# Patient Record
Sex: MALE | Race: WHITE | NOT HISPANIC OR LATINO | Employment: FULL TIME | URBAN - METROPOLITAN AREA
[De-identification: names, ages, dates, MRNs, and addresses within clinical notes are randomized per-mention and may not be internally consistent; named-entity substitution may affect disease eponyms.]

---

## 2017-09-19 ENCOUNTER — COMMUNICATION - HEALTHEAST (OUTPATIENT)
Dept: CARDIOLOGY | Facility: CLINIC | Age: 32
End: 2017-09-19

## 2017-09-19 DIAGNOSIS — I25.10 CORONARY ARTERY DISEASE: ICD-10-CM

## 2017-11-21 ENCOUNTER — OFFICE VISIT - HEALTHEAST (OUTPATIENT)
Dept: CARDIOLOGY | Facility: CLINIC | Age: 32
End: 2017-11-21

## 2017-11-21 DIAGNOSIS — I25.5 ISCHEMIC CARDIOMYOPATHY: ICD-10-CM

## 2017-11-21 ASSESSMENT — MIFFLIN-ST. JEOR: SCORE: 1730.05

## 2017-12-01 ENCOUNTER — HOSPITAL ENCOUNTER (OUTPATIENT)
Dept: CARDIOLOGY | Facility: CLINIC | Age: 32
Discharge: HOME | End: 2017-12-01
Attending: INTERNAL MEDICINE

## 2017-12-01 LAB
AORTIC ROOT: 3.3 CM
AORTIC VALVE MEAN VELOCITY: 65.5 CM/S
AV DIMENSIONLESS INDEX VTI: 0.7
AV MEAN GRADIENT: 2 MMHG
AV PEAK GRADIENT: 4.2 MMHG
AV VALVE AREA: 2.6 CM2
AV VELOCITY RATIO: 0.8
BSA FOR ECHO PROCEDURE: 1.99 M2
CV BLOOD PRESSURE: NORMAL MMHG
CV ECHO HEIGHT: 68 IN
CV ECHO WEIGHT: 182 LBS
DOP CALC AO PEAK VEL: 102 CM/S
DOP CALC AO VTI: 24.5 CM
DOP CALC LVOT AREA: 3.46 CM2
DOP CALC LVOT DIAMETER: 2.1 CM
DOP CALC LVOT PEAK VEL: 82 CM/S
DOP CALC LVOT STROKE VOLUME: 63.4 CM3
DOP CALCLVOT PEAK VEL VTI: 18.3 CM
ECHO EJECTION FRACTION ESTIMATED: 55 %
FRACTIONAL SHORTENING: 24.2 % (ref 28–44)
INTERVENTRICULAR SEPTUM IN END DIASTOLE: 0.95 CM (ref 0.6–1)
IVS/PW RATIO: 1.1
LA AREA 1: 18.6 CM2
LA AREA 2: 17.7 CM2
LEFT ATRIUM LENGTH: 4.86 CM
LEFT ATRIUM SIZE: 3 CM
LEFT ATRIUM VOLUME INDEX: 28.9 ML/M2
LEFT ATRIUM VOLUME: 57.6 CM3
LEFT VENTRICLE CARDIAC INDEX: 2 L/MIN/M2
LEFT VENTRICLE CARDIAC OUTPUT: 4.1 L/MIN
LEFT VENTRICLE DIASTOLIC VOLUME INDEX: 41.7 CM3/M2 (ref 34–74)
LEFT VENTRICLE DIASTOLIC VOLUME: 83 CM3 (ref 62–150)
LEFT VENTRICLE HEART RATE: 64 BPM
LEFT VENTRICLE MASS INDEX: 93.8 G/M2
LEFT VENTRICULAR INTERNAL DIMENSION IN DIASTOLE: 5.46 CM (ref 4.2–5.8)
LEFT VENTRICULAR INTERNAL DIMENSION IN SYSTOLE: 4.14 CM (ref 2.5–4)
LEFT VENTRICULAR MASS: 186.6 G
LEFT VENTRICULAR OUTFLOW TRACT MEAN GRADIENT: 2 MMHG
LEFT VENTRICULAR OUTFLOW TRACT MEAN VELOCITY: 58.5 CM/S
LEFT VENTRICULAR OUTFLOW TRACT PEAK GRADIENT: 3 MMHG
LEFT VENTRICULAR POSTERIOR WALL IN END DIASTOLE: 0.87 CM (ref 0.6–1)
LV STROKE VOLUME INDEX: 31.8 ML/M2
MITRAL VALVE E/A RATIO: 1.1
MV AVERAGE E/E' RATIO: 6.6 CM/S
MV DECELERATION TIME: 176 MS
MV E'TISSUE VEL-LAT: 11 CM/S
MV E'TISSUE VEL-MED: 6.82 CM/S
MV LATERAL E/E' RATIO: 5.3
MV MEDIAL E/E' RATIO: 8.6
MV PEAK A VELOCITY: 51.3 CM/S
MV PEAK E VELOCITY: 58.7 CM/S
NUC REST DIASTOLIC VOLUME INDEX: 2912 LBS
NUC REST SYSTOLIC VOLUME INDEX: 68 IN
PR MAX PG: 3 MMHG
PR PEAK VELOCITY: 91.2 CM/S
TRICUSPID REGURGITATION PEAK PRESSURE GRADIENT: 13.8 MMHG
TRICUSPID VALVE ANULAR PLANE SYSTOLIC EXCURSION: 2.3 CM
TRICUSPID VALVE PEAK REGURGITANT VELOCITY: 186 CM/S

## 2017-12-01 ASSESSMENT — MIFFLIN-ST. JEOR: SCORE: 1730.05

## 2017-12-12 ENCOUNTER — COMMUNICATION - HEALTHEAST (OUTPATIENT)
Dept: CARDIOLOGY | Facility: CLINIC | Age: 32
End: 2017-12-12

## 2018-04-18 ENCOUNTER — COMMUNICATION - HEALTHEAST (OUTPATIENT)
Dept: CARDIOLOGY | Facility: CLINIC | Age: 33
End: 2018-04-18

## 2018-04-18 DIAGNOSIS — I25.10 CORONARY ARTERY DISEASE: ICD-10-CM

## 2018-11-13 ENCOUNTER — COMMUNICATION - HEALTHEAST (OUTPATIENT)
Dept: CARDIOLOGY | Facility: CLINIC | Age: 33
End: 2018-11-13

## 2018-11-13 DIAGNOSIS — I25.10 CORONARY ARTERY DISEASE: ICD-10-CM

## 2019-02-18 ENCOUNTER — COMMUNICATION - HEALTHEAST (OUTPATIENT)
Dept: CARDIOLOGY | Facility: CLINIC | Age: 34
End: 2019-02-18

## 2019-02-18 DIAGNOSIS — I25.10 CORONARY ARTERY DISEASE: ICD-10-CM

## 2019-03-20 ENCOUNTER — COMMUNICATION - HEALTHEAST (OUTPATIENT)
Dept: CARDIOLOGY | Facility: CLINIC | Age: 34
End: 2019-03-20

## 2019-03-20 DIAGNOSIS — I25.10 CORONARY ARTERY DISEASE: ICD-10-CM

## 2019-03-20 RX ORDER — METOPROLOL SUCCINATE 25 MG/1
TABLET, EXTENDED RELEASE ORAL
Qty: 90 TABLET | Refills: 2 | Status: SHIPPED | OUTPATIENT
Start: 2019-03-20

## 2019-07-16 ENCOUNTER — AMBULATORY - HEALTHEAST (OUTPATIENT)
Dept: CARDIAC REHAB | Facility: CLINIC | Age: 34
End: 2019-07-16

## 2019-07-16 DIAGNOSIS — Z95.5 STENTED CORONARY ARTERY: ICD-10-CM

## 2019-07-22 ENCOUNTER — AMBULATORY - HEALTHEAST (OUTPATIENT)
Dept: CARDIAC REHAB | Facility: CLINIC | Age: 34
End: 2019-07-22

## 2019-07-22 DIAGNOSIS — Z95.5 STENTED CORONARY ARTERY: ICD-10-CM

## 2019-07-22 DIAGNOSIS — I21.11 ST ELEVATION MYOCARDIAL INFARCTION INVOLVING RIGHT CORONARY ARTERY (H): ICD-10-CM

## 2019-07-22 RX ORDER — NITROGLYCERIN 0.4 MG/1
0.4 TABLET SUBLINGUAL EVERY 5 MIN PRN
Status: SHIPPED | COMMUNITY
Start: 2019-07-22

## 2019-07-22 RX ORDER — ATORVASTATIN CALCIUM 80 MG/1
80 TABLET, FILM COATED ORAL AT BEDTIME
Status: SHIPPED | COMMUNITY
Start: 2019-07-22

## 2019-07-24 ENCOUNTER — AMBULATORY - HEALTHEAST (OUTPATIENT)
Dept: CARDIAC REHAB | Facility: CLINIC | Age: 34
End: 2019-07-24

## 2019-07-24 DIAGNOSIS — I21.11 ST ELEVATION MYOCARDIAL INFARCTION INVOLVING RIGHT CORONARY ARTERY (H): ICD-10-CM

## 2019-07-24 DIAGNOSIS — Z95.5 STENTED CORONARY ARTERY: ICD-10-CM

## 2019-07-26 ENCOUNTER — AMBULATORY - HEALTHEAST (OUTPATIENT)
Dept: CARDIAC REHAB | Facility: CLINIC | Age: 34
End: 2019-07-26

## 2019-07-26 DIAGNOSIS — Z95.5 STENTED CORONARY ARTERY: ICD-10-CM

## 2019-07-26 DIAGNOSIS — I21.11 ST ELEVATION MYOCARDIAL INFARCTION INVOLVING RIGHT CORONARY ARTERY (H): ICD-10-CM

## 2019-07-29 ENCOUNTER — AMBULATORY - HEALTHEAST (OUTPATIENT)
Dept: CARDIAC REHAB | Facility: CLINIC | Age: 34
End: 2019-07-29

## 2019-07-29 DIAGNOSIS — I21.11 ST ELEVATION MYOCARDIAL INFARCTION INVOLVING RIGHT CORONARY ARTERY (H): ICD-10-CM

## 2019-07-29 DIAGNOSIS — Z95.5 STENTED CORONARY ARTERY: ICD-10-CM

## 2019-07-31 ENCOUNTER — AMBULATORY - HEALTHEAST (OUTPATIENT)
Dept: CARDIAC REHAB | Facility: CLINIC | Age: 34
End: 2019-07-31

## 2019-07-31 DIAGNOSIS — Z95.5 STENTED CORONARY ARTERY: ICD-10-CM

## 2019-08-02 ENCOUNTER — AMBULATORY - HEALTHEAST (OUTPATIENT)
Dept: CARDIAC REHAB | Facility: CLINIC | Age: 34
End: 2019-08-02

## 2019-08-02 DIAGNOSIS — I21.11 ST ELEVATION MYOCARDIAL INFARCTION INVOLVING RIGHT CORONARY ARTERY (H): ICD-10-CM

## 2019-08-02 DIAGNOSIS — Z95.5 STENTED CORONARY ARTERY: ICD-10-CM

## 2019-08-05 ENCOUNTER — AMBULATORY - HEALTHEAST (OUTPATIENT)
Dept: CARDIAC REHAB | Facility: CLINIC | Age: 34
End: 2019-08-05

## 2019-08-05 DIAGNOSIS — Z95.5 STENTED CORONARY ARTERY: ICD-10-CM

## 2019-08-05 DIAGNOSIS — I21.11 ST ELEVATION MYOCARDIAL INFARCTION INVOLVING RIGHT CORONARY ARTERY (H): ICD-10-CM

## 2019-08-07 ENCOUNTER — AMBULATORY - HEALTHEAST (OUTPATIENT)
Dept: CARDIAC REHAB | Facility: CLINIC | Age: 34
End: 2019-08-07

## 2019-08-07 DIAGNOSIS — Z95.5 STENTED CORONARY ARTERY: ICD-10-CM

## 2019-08-07 DIAGNOSIS — I21.11 ST ELEVATION MYOCARDIAL INFARCTION INVOLVING RIGHT CORONARY ARTERY (H): ICD-10-CM

## 2019-08-09 ENCOUNTER — AMBULATORY - HEALTHEAST (OUTPATIENT)
Dept: CARDIAC REHAB | Facility: CLINIC | Age: 34
End: 2019-08-09

## 2019-08-09 DIAGNOSIS — I21.11 ST ELEVATION MYOCARDIAL INFARCTION INVOLVING RIGHT CORONARY ARTERY (H): ICD-10-CM

## 2019-08-09 DIAGNOSIS — Z95.5 STENTED CORONARY ARTERY: ICD-10-CM

## 2019-08-12 ENCOUNTER — AMBULATORY - HEALTHEAST (OUTPATIENT)
Dept: CARDIAC REHAB | Facility: CLINIC | Age: 34
End: 2019-08-12

## 2019-08-12 DIAGNOSIS — Z95.5 STENTED CORONARY ARTERY: ICD-10-CM

## 2019-08-12 DIAGNOSIS — I21.11 ST ELEVATION MYOCARDIAL INFARCTION INVOLVING RIGHT CORONARY ARTERY (H): ICD-10-CM

## 2019-08-14 ENCOUNTER — AMBULATORY - HEALTHEAST (OUTPATIENT)
Dept: CARDIAC REHAB | Facility: CLINIC | Age: 34
End: 2019-08-14

## 2019-08-14 DIAGNOSIS — I21.11 ST ELEVATION MYOCARDIAL INFARCTION INVOLVING RIGHT CORONARY ARTERY (H): ICD-10-CM

## 2019-08-14 DIAGNOSIS — Z95.5 STENTED CORONARY ARTERY: ICD-10-CM

## 2019-08-16 ENCOUNTER — AMBULATORY - HEALTHEAST (OUTPATIENT)
Dept: CARDIAC REHAB | Facility: CLINIC | Age: 34
End: 2019-08-16

## 2019-08-16 DIAGNOSIS — Z95.5 STENTED CORONARY ARTERY: ICD-10-CM

## 2019-08-16 DIAGNOSIS — I21.11 ST ELEVATION MYOCARDIAL INFARCTION INVOLVING RIGHT CORONARY ARTERY (H): ICD-10-CM

## 2019-08-19 ENCOUNTER — AMBULATORY - HEALTHEAST (OUTPATIENT)
Dept: CARDIAC REHAB | Facility: CLINIC | Age: 34
End: 2019-08-19

## 2019-08-19 DIAGNOSIS — I21.11 ST ELEVATION MYOCARDIAL INFARCTION INVOLVING RIGHT CORONARY ARTERY (H): ICD-10-CM

## 2019-08-19 DIAGNOSIS — Z95.5 STENTED CORONARY ARTERY: ICD-10-CM

## 2019-08-21 ENCOUNTER — AMBULATORY - HEALTHEAST (OUTPATIENT)
Dept: CARDIAC REHAB | Facility: CLINIC | Age: 34
End: 2019-08-21

## 2019-08-21 DIAGNOSIS — Z95.5 STENTED CORONARY ARTERY: ICD-10-CM

## 2019-08-21 DIAGNOSIS — I21.11 ST ELEVATION MYOCARDIAL INFARCTION INVOLVING RIGHT CORONARY ARTERY (H): ICD-10-CM

## 2019-08-23 ENCOUNTER — AMBULATORY - HEALTHEAST (OUTPATIENT)
Dept: CARDIAC REHAB | Facility: CLINIC | Age: 34
End: 2019-08-23

## 2019-08-23 DIAGNOSIS — Z95.5 STENTED CORONARY ARTERY: ICD-10-CM

## 2019-08-23 DIAGNOSIS — I21.11 ST ELEVATION MYOCARDIAL INFARCTION INVOLVING RIGHT CORONARY ARTERY (H): ICD-10-CM

## 2020-01-24 ENCOUNTER — COMMUNICATION - HEALTHEAST (OUTPATIENT)
Dept: CARDIOLOGY | Facility: CLINIC | Age: 35
End: 2020-01-24

## 2020-01-24 DIAGNOSIS — I25.10 CORONARY ARTERY DISEASE: ICD-10-CM

## 2021-05-30 NOTE — PROGRESS NOTES
ITP ASSESSMENT   Assessment Day: Initial    Session Number: 1  Precautions: Standard Cardiac sx    Diagnosis: MI;Stent    Risk Stratification: Medium    Referring Provider: Maribel Daniels MD   ITP sent to Dr. Clark  EXERCISE  Exercise Assessment: Initial       6 Minute Walk Test   Pre   Pre Exercise HR: 58                    Pre Exercise BP: 88/60      Peak  Peak HR: 80                   Peak BP: 112/58    Peak feet: 1650    Peak O2 SAT: 100    Peak RPE: 7    Peak MPH: 3.12      Symptoms:  Peak Symptoms: denies sx      5 mins. Post  5 Min Post HR: 51    5 Min Post BP: 86/62                           Exercise Plan  Goals Next 30 days  ADL'S: Resume mowing lawn 2x/month or more by mocking 5-6 METs in rehab.    Leisure: Resume biking 1-3x/week for 20-30 mins.  LTG is to bike 3-4x/week for 1-2 hours.    Work: Pt has resumed regular work duties.      Education Goals: All goals in this section met    Education Goals Met: Patient can state cardiac s/s and appropriate emergency response.;Has system for taking medication.;Medication review.      Exercise Prescription  Exercise Mode: Treadmill;Bike;Nustep;Arm Erg.;Stairs    Frequency: 3x/week    Duration: 35-50 mins    Intensity / THR: 20-30 beats above resting heart rate    RPE 11-14  Progression / Met level: 4.5-6    Resistive Training?: Yes      Current Exercise (mins/week): 200 (Walking)      Interventions  Home Exercise:  Mode: Walking, biking    Frequency: 2-3x/week    Duration: 30-60 mins      Education Material : Educational videos;Provide written material;Individual education and counseling;Offer educational classes      Education Completed  Exercise Education Completed: Cardiac Anatomy;Signs and Symptoms;Medication review;RPE;Emergency Plan;Home Exercise;Warm up/cool down;FITT Principles;BP/HR Reponse to exercise;Benefits of Exercise;End point of exercise              Exercise Follow-up/Discharge  Follow up/Discharge: Pt is very active and wants to return to  "mountain biking several times per week.   NUTRITION  Nutrition Assessment: Initial      Nutrition Risk Factors:  Nutrition Risk Factors: NA      Nutrition Plan  Interventions  No data recorded  Other Nutrition Intervention: Therapist/Pt Discussion;Educational Videos;Provide with Written Material      Education Completed  Nutrition Education Completed: Risk factor overview      Goals  Nutrition Goals (Next 30 days): Patient will maintain current weight or gradual weight gain      Goals Met  Nutrition Goals Met: Completed Nutritional Risk Screen;Provided Rate your Plate Survey;Reviewed Dietitian schedule      Height, Weight, and  BMI  Weight: 153 lb 4.8 oz (69.5 kg)  Height: 5' 7\" (1.702 m)  BMI: 24      Nutrition Follow-up  Follow-up/Discharge: Pt has recently lost around 30 lbs.  He is not sure if he wants to meet with the dietician.  He may meet with dietician.         Other Risk Factors  Other Risk Factor Assessment: Initial      HTN Risk Factor: NA      Pre Exercise BP: (!) 88/60  Post Exercise BP: (!) 86/62        Tobacco Risk Factor: NA       PSYCHOSOCIAL  Psychosocial Assessment: Initial       [place] COOP Q of L Summary Score: 18      PHQ-9 Total Score: 3      Psychosocial Risk Factor: NA      Psychosocial Plan  Interventions  Interventions: Offer educational videos and classes;Provide written material;Individual education and counseling       Education Completed  Education Completed: Relaxation/Coping Techniques      Goals  Goals (Next 30 days): Identify stressors;Improvement in [place] COOP score;Practicing stress management skills      Goals Met  Goals Met: Identified Support system      Psychosocial Follow-up  Follow-up/Discharge: Pt denies regular stress and scored very low on PHQ-9.             Patient involved in Goal setting?: Yes      Signature: _____________________________________________________________    Date: __________________    Time: __________________  "

## 2021-05-30 NOTE — PROGRESS NOTES
Cardiac Rehab  Phase II Assessment    Assessment Date: 7/22/2019    Diagnosis: STEMI  Date of Onset: 7/7/19  Procedure: PCI with IWONA x 3  Date of Onset: 7/7/19  ICD/Pacemaker: No Parameters: NA  Post-op Complications: episode of V-tach, shock x 1  ECG History: SR/SA EF%:50-55, per echo 7/8/19  Past Medical History:  has a past medical history of Hypokalemia (11/13/2014), STEMI (ST elevation myocardial infarction) (H) (11/12/2014), and Ventricular fibrillation (H) (11/13/2014).   Hx of fracture of R scapula; lifetime non-smoker;     Physical Assessment  Precautions/ Physical Limitations: Standard cardiac sx  Oxygen: No  O2 Sats: 100 Lung Sounds: clear Edema: none  Incisions: clean/dry/intact  Sleeping Pattern: good   Appetite: good   Nutrition Risk Screen: Completed.    Pain  Location: denies  Characteristics:none  Intensity: (0-10 scale) 0  Current Pain Management: NA  Intervention: NA  Response: NA    Psychosocial/ Emotional Health  1. In the past 12 months, have you been in a relationship where you have been abused physically, emotionally, sexually or financially? No  notified: NA  2. Who do you turn to for emotional support?: wife  3. Do you have cultural or spiritual needs? No  4. Have there been any major life changes in the past 12 months? No    Referral Information  Primary Physician: Marco Alva MD- retired; [facility]  Cardiologist: Dr. Maribel Daniels  Surgeon: Dr. Maribel Daniels    Home exercise/Equipment: TM, road bike    Patient's long-term goal(s): Resume yardwork, riding bikes    1. Living Accommodations: Home Steps: Yes      Support people at home: wife   2. Marital Status:   3. Family is able to assist with cares      Lutheran/Community involvement: none  4. Recreation/Hobbies: Biking, skiing

## 2021-05-31 VITALS — WEIGHT: 182 LBS | BODY MASS INDEX: 27.58 KG/M2 | HEIGHT: 68 IN

## 2021-05-31 VITALS — HEIGHT: 68 IN | WEIGHT: 182 LBS | BODY MASS INDEX: 27.58 KG/M2

## 2021-05-31 NOTE — PROGRESS NOTES
ITP ASSESSMENT   Assessment Day: Last Day    Session Number: 16  Precautions: Standard Cardiac sx    Diagnosis: MI;Stent    Risk Stratification: Medium    Referring Provider: Marco Alva MD   ITP:Dr. Clark  EXERCISE  Exercise Assessment: Discharge       6 Minute Walk Test   Pre   Pre Exercise HR: 60                    Pre Exercise BP: 100/62      Peak  Peak HR: 87                   Peak BP: 114/70    Peak feet: 1760    Peak O2 SAT: 100    Peak RPE: 11    Peak MPH: 3.33      Symptoms:  Peak Symptoms: none      5 mins. Post  5 Min Post HR: 52    5 Min Post BP: 92/56                           Exercise Plan  Goals Next 30 days  ADL'S: Independent in all ADL's    Leisure: Independent in all Leisures    Work: Indepedent in all work duties       Education Goals: All goals in this section met    Education Goals Met: Patient can state cardiac s/s and appropriate emergency response.;Has system for taking medication.;Medication review.                          Goals Met  Initial ADL's goals met: Goal Met: Pt has resumed mowing lawn 2x/month.     Initial Leisure goals met: Goal Met: Pt has resumed biking 1-3x/week for 20-30 mins.  LTG is to bike 3-4x/week for 1-2 hours.    Intial Work goals met: Pt has resumed regular work duties.    Initial Progression: Pt has reached 7.8 METs on Vision, 7.6 METs on AirDyne, 6.1 METs on TM. Continue to encouage and progress pt to reach Long term goal of 8 METs.       Exercise Prescription  Exercise Mode: Treadmill;Bike;Nustep;Arm Erg.;Stairs    Frequency: 3x/week    Duration: 35-50 min    Intensity / THR: 20-30 beats above resting heart rate    RPE 11-14  Progression / Met level: 7-8+    Resistive Training?: Yes      Current Exercise (mins/week): 250      Interventions  Home Exercise:  Mode: Walking, Biking      Frequency: 3-4x/week    Duration:  min      Education Material : Educational videos;Provide written material;Individual education and counseling;Offer educational  "classes      Education Completed  Exercise Education Completed: Cardiac Anatomy;Signs and Symptoms;Medication review;RPE;Emergency Plan;Home Exercise;Warm up/cool down;FITT Principles;BP/HR Reponse to exercise;Benefits of Exercise;End point of exercise              Exercise Follow-up/Discharge  Follow up/Discharge: Discharge           NUTRITION  Nutrition Assessment: Discharge      Nutrition Risk Factors:  Nutrition Risk Factors: NA      Nutrition Plan  Interventions  Diet Consult: Completed    Other Nutrition Intervention: Therapist/Pt Discussion;Provide with Written Material;Educational Videos    Initial Rate Your Plate Score: 56        Education Completed  Nutrition Education Completed: Low Saturated fat diet;Low sodium diet          Goals Met  Nutrition Goals Met: Patient follows a low sodium diet;Patient states following a low saturated fat diet;Patient knows appropriate portion size      Height, Weight, and  BMI  Weight: 152 lb 1.6 oz (69 kg)  Height: 5' 7\" (1.702 m)  BMI: 23.82      Nutrition Follow-up  Follow-up/Discharge: RD diet educ completed 8/12       Other Risk Factors  Other Risk Factor Assessment: Discharge      HTN Risk Factor: NA      Pre Exercise BP: 100/62  Post Exercise BP: (!) 86/62      Tobacco Risk Factor: NA           PSYCHOSOCIAL  Psychosocial Assessment: Discharge       [place] COOP Q of L Summary Score: 13      PHQ-9 Total Score: 1      Psychosocial Risk Factor: NA      Psychosocial Plan  Interventions  Interventions: Offer educational videos and classes;Provide written material;Individual education and counseling      Education Completed  Education Completed: Relaxation/Coping Techniques          Goals Met  Goals Met: Practicing stress management skills;Improvement in [place] COOP score      Psychosocial Follow-up  Follow-up/Discharge: Discharge           Patient involved in Goal setting?: Yes  Pt pleased with progress, has reached 7.8 MET level.  Pt verbalized good understanding of " home program instructions and is motivated to continue exercising regularly.      Signature: _____________________________________________________________    Date: __________________    Time: __________________

## 2021-05-31 NOTE — PROGRESS NOTES
ITP ASSESSMENT   Assessment Day: 30 Day    Session Number: 15  Precautions: Standard Cardiac sx    Diagnosis: MI;Stent    Risk Stratification: Medium    Referring Provider: Marco Alva MD   ITP sent to Dr. Clark   EXERCISE  Exercise Assessment: Reassessment                         Exercise Plan  Goals Next 30 days  ADL'S: Independent in all ADL's    Leisure: Independent in all Leisures    Work: Indepedent in all work duties       Education Goals: All goals in this section met    Education Goals Met: Patient can state cardiac s/s and appropriate emergency response.;Has system for taking medication.;Medication review.                          Goals Met  Initial ADL's goals met: Goal Met: Pt has resumed mowing lawn 2x/month.     Initial Leisure goals met: Goal Met: Pt has resumed biking 1-3x/week for 20-30 mins.  LTG is to bike 3-4x/week for 1-2 hours.    Intial Work goals met: Pt has resumed regular work duties.    Initial Progression: Pt has reached 7.8 METs on Vision, 7.6 METs on AirDyne, 6.1 METs on TM. Continue to encouage and progress pt to reach Long term goal of 8 METs.       Exercise Prescription  Exercise Mode: Treadmill;Bike;Nustep;Arm Erg.;Stairs    Frequency: 3x/week    Duration: 35-50 min    Intensity / THR: 20-30 beats above resting heart rate    RPE 11-14  Progression / Met level: 7-8+    Resistive Training?: Yes      Current Exercise (mins/week): 200      Interventions  Home Exercise:  Mode: Walking, Biking      Frequency: 3-4x/week    Duration:  min      Education Material : Educational videos;Provide written material;Individual education and counseling;Offer educational classes      Education Completed  Exercise Education Completed: Cardiac Anatomy;Signs and Symptoms;Medication review;RPE;Emergency Plan;Home Exercise;Warm up/cool down;FITT Principles;BP/HR Reponse to exercise;Benefits of Exercise;End point of exercise              Exercise Follow-up/Discharge  Follow up/Discharge: Pt  "has returned to mountain biking several times per week. Continue to remind pt of Emergency plan with [employer].            NUTRITION  Nutrition Assessment: Reassessment      Nutrition Risk Factors:  Nutrition Risk Factors: NA      Nutrition Plan  Interventions  Diet Consult: Completed    Other Nutrition Intervention: Therapist/Pt Discussion;Provide with Written Material;Educational Videos    Initial Rate Your Plate Score: 56    Education Completed  Nutrition Education Completed: Low Saturated fat diet;Low sodium diet      Goals  Nutrition Goals (Next 30 days): Patient will follow a low sodium diet;Patient will follow a low saturated fat diet;Patient knows appropriate portion size      Goals Met  Nutrition Goals Met: Patient follows a low sodium diet;Patient states following a low saturated fat diet;Patient knows appropriate portion size      Height, Weight, and  BMI  Weight: 152 lb 1.6 oz (69 kg)  Height: 5' 7\" (1.702 m)  BMI: 23.82      Nutrition Follow-up  Follow-up/Discharge: RD diet educ completed 8/12       Other Risk Factors  Other Risk Factor Assessment: Reassessment      HTN Risk Factor: NA      Pre Exercise BP: 96/60  Post Exercise BP: 94/62        Tobacco Risk Factor: NA     PSYCHOSOCIAL  Psychosocial Assessment: Reassessment       [place] COOP Q of L Summary Score: 18      PHQ-9 Total Score: 3      Psychosocial Risk Factor: NA      Psychosocial Plan  Interventions  Interventions: Offer educational videos and classes;Provide written material;Individual education and counseling      Education Completed  Education Completed: Relaxation/Coping Techniques      Goals  Goals (Next 30 days): Improvement in [place] COOP score;Practicing stress management skills      Goals Met  Goals Met: Identified Support system      Psychosocial Follow-up  Follow-up/Discharge: Pt denies regular stress, Pt enjoys biking and playing Repeatit games            Patient involved in Goal setting?: Yes      Signature: " _____________________________________________________________    Date: __________________    Time: __________________

## 2021-06-03 VITALS — BODY MASS INDEX: 23.14 KG/M2 | WEIGHT: 152.2 LBS

## 2021-06-03 VITALS — WEIGHT: 152.1 LBS | BODY MASS INDEX: 23.13 KG/M2

## 2021-06-03 VITALS — WEIGHT: 152.9 LBS | BODY MASS INDEX: 23.25 KG/M2

## 2021-06-03 VITALS — WEIGHT: 151.5 LBS | BODY MASS INDEX: 23.04 KG/M2

## 2021-06-03 VITALS — BODY MASS INDEX: 23.25 KG/M2 | WEIGHT: 152.9 LBS

## 2021-06-03 VITALS — BODY MASS INDEX: 22.73 KG/M2 | WEIGHT: 149.5 LBS

## 2021-06-03 VITALS — WEIGHT: 150.5 LBS | BODY MASS INDEX: 22.88 KG/M2

## 2021-06-03 VITALS — WEIGHT: 150.7 LBS | BODY MASS INDEX: 22.91 KG/M2

## 2021-06-03 VITALS — BODY MASS INDEX: 23.19 KG/M2 | WEIGHT: 152.5 LBS

## 2021-06-03 VITALS — BODY MASS INDEX: 22.96 KG/M2 | WEIGHT: 151 LBS

## 2021-06-03 VITALS — WEIGHT: 148.5 LBS | BODY MASS INDEX: 22.58 KG/M2

## 2021-06-03 VITALS — WEIGHT: 149.4 LBS | BODY MASS INDEX: 22.72 KG/M2

## 2021-06-03 VITALS — BODY MASS INDEX: 23.31 KG/M2 | WEIGHT: 153.3 LBS

## 2021-06-03 VITALS — BODY MASS INDEX: 22.85 KG/M2 | WEIGHT: 150.3 LBS

## 2021-06-25 NOTE — PROGRESS NOTES
Progress Notes by America Dorado MD at 11/21/2017  8:10 AM     Author: America Dorado MD Service: -- Author Type: Physician    Filed: 11/21/2017  8:35 AM Encounter Date: 11/21/2017 Status: Signed    : America Dorado MD (Physician)           Click to link to [facility] HEART [facility] NOTE    Thank you, Dr. Alva, for asking us to see Anson Stone at the [facility].      Assessment/Recommendations   Assessment:    1.  Status post anterior myocardial infarction: Status post IWONA to LAD in 2014.  Doing well without any anginal complaints.  Continue on daily aspirin.  2.  History of ischemic cardiomyopathy: Follow-up MOLLY showed preserved left ventricular systolic function with wall motion abnormality.  Recommend repeat echocardiogram this year.  3.  Lipids are well-controlled.  Follow-up in 1 year.     History of Present Illness    Mr. Anson Stone is a 32 y.o. male with a known history of anterior myocardial infarction thought to be secondary to an embolus who is status post drug-eluting stent to the mid LAD on 11/12/14.  He has been doing well.  He exercises fairly regularly.  He has a dog and has been walking the dog frequently.  He has not noted any problems with chest discomfort or breathing difficulty.       Physical Examination Review of Systems   Vitals:    11/21/17 0813   BP: 128/82   Pulse: 72   Resp: 18     Body mass index is 27.67 kg/(m^2).  Wt Readings from Last 3 Encounters:   11/21/17 182 lb (82.6 kg)   11/15/16 179 lb (81.2 kg)   09/29/15 175 lb (79.4 kg)       General Appearance:   alert, no apparent distress   HEENT:  no scleral icterus; the mucous membranes are pink and moist                                  Neck: jugular venous pressure normal   Chest: the spine is straight and the chest is symmetric   Lungs:   respirations unlabored; the lungs are clear to auscultation   Cardiovascular:   regular rhythm with normal first  and second heart sounds and no murmurs or gallops   Abdomen:  no organomegaly, masses, bruits, or tenderness; bowel sounds are present   Extremities: no edema   Skin: no xanthelasma    General: WNL  Eyes: WNL  Ears/Nose/Throat: WNL  Lungs: WNL  Heart: WNL  Stomach: WNL  Bladder: WNL  Muscle/Joints: WNL  Skin: WNL  Nervous System: WNL  Mental Health: WNL     Blood: WNL     Medical History  Surgical History Family History Social History   Anterior STEMI Status post IWONA to LAD Family History   Problem Relation Age of Onset   ? Heart attack Paternal Uncle    ? Heart attack Paternal Grandmother     Social History     Social History   ? Marital status: Single     Spouse name: N/A   ? Number of children: N/A   ? Years of education: N/A     Occupational History   ? Not on file.     Social History Main Topics   ? Smoking status: Never Smoker   ? Smokeless tobacco: Never Used   ? Alcohol use Not on file   ? Drug use: No   ? Sexual activity: Not on file     Other Topics Concern   ? Not on file     Social History Narrative          Medications  Allergies   Current Outpatient Prescriptions   Medication Sig Dispense Refill   ? aspirin 81 MG EC tablet Take 81 mg by mouth daily.     ? metoprolol succinate (TOPROL-XL) 25 MG TAKE 1 TABLET BY MOUTH EVERY DAY 90 tablet 1     No current facility-administered medications for this visit.       No Known Allergies      Lab Results    Chemistry/lipid CBC Cardiac Enzymes/BNP/TSH/INR   Lab Results   Component Value Date    CHOL 133 11/15/2016    HDL 47 11/15/2016    LDLCALC 70 11/15/2016    TRIG 79 11/15/2016    CREATININE 1.07 03/11/2015    BUN 10 03/11/2015    K 3.8 03/11/2015     03/11/2015     03/11/2015    CO2 27 03/11/2015    Lab Results   Component Value Date    WBC 6.9 03/11/2015    HGB 16.0 03/11/2015    HCT 47.1 03/11/2015    MCV 90 03/11/2015     (L) 03/11/2015    Lab Results   Component Value Date    CKMB 92 (HH) 11/13/2014    TROPONINI <0.01 03/11/2015    INR  1.01 11/12/2014

## 2021-08-21 ENCOUNTER — HEALTH MAINTENANCE LETTER (OUTPATIENT)
Age: 36
End: 2021-08-21

## 2021-10-16 ENCOUNTER — HEALTH MAINTENANCE LETTER (OUTPATIENT)
Age: 36
End: 2021-10-16

## 2022-10-01 ENCOUNTER — HEALTH MAINTENANCE LETTER (OUTPATIENT)
Age: 37
End: 2022-10-01

## 2023-10-15 ENCOUNTER — HEALTH MAINTENANCE LETTER (OUTPATIENT)
Age: 38
End: 2023-10-15

## 2024-01-06 ENCOUNTER — ANCILLARY PROCEDURE (OUTPATIENT)
Dept: GENERAL RADIOLOGY | Facility: CLINIC | Age: 39
End: 2024-01-06
Attending: NURSE PRACTITIONER

## 2024-01-06 ENCOUNTER — OFFICE VISIT (OUTPATIENT)
Dept: URGENT CARE | Facility: URGENT CARE | Age: 39
End: 2024-01-06
Payer: OTHER MISCELLANEOUS

## 2024-01-06 VITALS
DIASTOLIC BLOOD PRESSURE: 84 MMHG | SYSTOLIC BLOOD PRESSURE: 124 MMHG | RESPIRATION RATE: 16 BRPM | OXYGEN SATURATION: 100 % | HEART RATE: 89 BPM

## 2024-01-06 DIAGNOSIS — S67.02XA CRUSHING INJURY OF LEFT THUMB, INITIAL ENCOUNTER: ICD-10-CM

## 2024-01-06 DIAGNOSIS — S67.02XA CRUSHING INJURY OF LEFT THUMB, INITIAL ENCOUNTER: Primary | ICD-10-CM

## 2024-01-06 PROCEDURE — 73140 X-RAY EXAM OF FINGER(S): CPT | Mod: TC | Performed by: RADIOLOGY

## 2024-01-06 PROCEDURE — 99204 OFFICE O/P NEW MOD 45 MIN: CPT | Performed by: NURSE PRACTITIONER

## 2024-01-06 NOTE — PROGRESS NOTES
Assessment & Plan     Crushing injury of left thumb, initial encounter  - XR Finger Left G/E 2 Views       Patient Instructions     Results for orders placed or performed in visit on 01/06/24   XR Finger Left G/E 2 Views     Status: None    Narrative    EXAM: XR FINGER LEFT G/E 2 VIEWS  LOCATION: [facility]  DATE: 1/6/2024    INDICATION: crush injury to distal thumb   in Ski boot binding, pain  COMPARISON: None.      Impression    IMPRESSION: No acute fracture or malalignment. Mild first MCP joint degenerative changes.       Rest the affected painful area as much as possible.    Apply ice for 15-20 minutes intermittently as needed and especially after any offending activity.   As pain recedes, begin normal activities slowly as tolerated.    Aleve twice a day for 3-4 days        Return in about 1 week (around 1/13/2024) for with regular provider if symptoms persist.    SAROJ Cueto CNP  [facility]    Yaima Griggs is a 38 year old male who presents to clinic today for the following health issues:  Chief Complaint   Patient presents with    Thumb Discomfort     L thumb was squished in between a ski binding      HPI    MS Injury/Pain    Onset of symptoms was 1 hour(s) ago.  Location: left finger  first  Context:       The injury happened while at work      Mechanism: crushed in a ski boot binding      Patient experienced immediate pain, immediate swelling, was able to bear weight directly after injury, no deformity was noted by the patient  Course of symptoms is same.    Severity moderate  Current and Associated symptoms: Pain, Swelling, and Stiffness  Denies  Warmth and Redness  Aggravating Factors: repetitive motion  Therapies to improve symptoms include: ice and ibuprofen  This is the first time this type of problem has occurred for this patient.       Review of Systems  Constitutional, HEENT, cardiovascular, pulmonary, GI, , musculoskeletal, neuro,  skin, endocrine and psych systems are negative, except as otherwise noted.      Objective    /84   Pulse 89   Resp 16   SpO2 100%   Physical Exam   GENERAL: healthy, alert and no distress  RESP: lungs clear to auscultation - no rales, rhonchi or wheezes  CV: regular rate and rhythm, normal S1 S2, no S3 or S4, no murmur, click or rub, no peripheral edema and peripheral pulses strong  MS: normal range of motion, 1+ edema to distal left thumb, peripheral pulses normal, and LUE exam shows ecchymosis to distal left thumb with 2 small 2-3 mm lacerations    Wounds irrigated and secured with 2 steristrips.

## 2024-01-06 NOTE — PATIENT INSTRUCTIONS
Results for orders placed or performed in visit on 01/06/24   XR Finger Left G/E 2 Views     Status: None    Narrative    EXAM: XR FINGER LEFT G/E 2 VIEWS  LOCATION: [facility]  DATE: 1/6/2024    INDICATION: crush injury to distal thumb   in Ski boot binding, pain  COMPARISON: None.      Impression    IMPRESSION: No acute fracture or malalignment. Mild first MCP joint degenerative changes.       Rest the affected painful area as much as possible.    Apply ice for 15-20 minutes intermittently as needed and especially after any offending activity.   As pain recedes, begin normal activities slowly as tolerated.    Aleve twice a day for 3-4 days

## 2024-12-07 ENCOUNTER — HEALTH MAINTENANCE LETTER (OUTPATIENT)
Age: 39
End: 2024-12-07